# Patient Record
Sex: FEMALE | Race: BLACK OR AFRICAN AMERICAN | Employment: FULL TIME | URBAN - METROPOLITAN AREA
[De-identification: names, ages, dates, MRNs, and addresses within clinical notes are randomized per-mention and may not be internally consistent; named-entity substitution may affect disease eponyms.]

---

## 2021-07-27 ENCOUNTER — HOSPITAL ENCOUNTER (EMERGENCY)
Age: 36
Discharge: HOME | End: 2021-07-27
Attending: EMERGENCY MEDICINE

## 2021-07-27 VITALS
OXYGEN SATURATION: 100 % | HEART RATE: 95 BPM | HEIGHT: 57 IN | SYSTOLIC BLOOD PRESSURE: 120 MMHG | RESPIRATION RATE: 16 BRPM | DIASTOLIC BLOOD PRESSURE: 63 MMHG | WEIGHT: 130 LBS | TEMPERATURE: 99.8 F | BODY MASS INDEX: 28.05 KG/M2

## 2021-07-27 DIAGNOSIS — J03.90 ACUTE TONSILLITIS, UNSPECIFIED ETIOLOGY: Primary | ICD-10-CM

## 2021-07-27 LAB
COVID-19 RAPID TEST, COVR: NOT DETECTED
S PYO AG THROAT QL: NEGATIVE
SOURCE, COVRS: NORMAL

## 2021-07-27 PROCEDURE — 87880 STREP A ASSAY W/OPTIC: CPT

## 2021-07-27 PROCEDURE — 87635 SARS-COV-2 COVID-19 AMP PRB: CPT

## 2021-07-27 PROCEDURE — 74011250637 HC RX REV CODE- 250/637: Performed by: EMERGENCY MEDICINE

## 2021-07-27 PROCEDURE — 99283 EMERGENCY DEPT VISIT LOW MDM: CPT

## 2021-07-27 PROCEDURE — 87070 CULTURE OTHR SPECIMN AEROBIC: CPT

## 2021-07-27 RX ORDER — DEXAMETHASONE SODIUM PHOSPHATE 4 MG/ML
10 INJECTION, SOLUTION INTRA-ARTICULAR; INTRALESIONAL; INTRAMUSCULAR; INTRAVENOUS; SOFT TISSUE
Status: COMPLETED | OUTPATIENT
Start: 2021-07-27 | End: 2021-07-27

## 2021-07-27 RX ORDER — AMOXICILLIN AND CLAVULANATE POTASSIUM 875; 125 MG/1; MG/1
1 TABLET, FILM COATED ORAL 2 TIMES DAILY
Qty: 20 TABLET | Refills: 0 | Status: SHIPPED | OUTPATIENT
Start: 2021-07-27 | End: 2021-08-06

## 2021-07-27 RX ADMIN — DEXAMETHASONE SODIUM PHOSPHATE 10 MG: 4 INJECTION, SOLUTION INTRAMUSCULAR; INTRAVENOUS at 21:13

## 2021-07-27 NOTE — LETTER
[facility] [place]  [facility] EMERGENCY DEPT  [address] [facility] [address] [phone]    Work/School Note    Date: 7/27/2021    To Whom It May concern:    Kian Trevizo was seen and treated today in the emergency room by the following provider(s):  Attending Provider: Margo Cabello DO  Physician Assistant: JOANN Arizmendi.       Kian Trevizo may return to work 7/30/21    Sincerely,          Inga Raphael PA-C

## 2021-07-28 NOTE — ED PROVIDER NOTES
EMERGENCY DEPARTMENT HISTORY AND PHYSICAL EXAM    Date: 7/27/2021  Patient Name: Porsha Paz    History of Presenting Illness     Chief Complaint   Patient presents with    Sore Throat         History Provided By: Patient    Additional History (Context): Porsha Paz is a 37 y.o. female with asthma who presents with complaint of sore throat pain primarily on the left today. Denies fever drooling trismus or possibility of pregnancy. Not wheezing not coughing. Not vaccinated for Covid. PCP: Lizabeth Rodriguez MD        Past History     Past Medical History:  Past Medical History:   Diagnosis Date    Asthma        Past Surgical History:  History reviewed. No pertinent surgical history. Family History:  History reviewed. No pertinent family history. Social History:  Social History     Tobacco Use    Smoking status: Never Smoker    Smokeless tobacco: Never Used   Substance Use Topics    Alcohol use: Yes     Comment: rare    Drug use: No       Allergies:  No Known Allergies      Review of Systems   Review of Systems   Constitutional: Negative for fever. HENT: Positive for sore throat and trouble swallowing. Negative for congestion. Respiratory: Negative for cough. Skin: Negative for rash. All Other Systems Negative  Physical Exam     Vitals:    07/27/21 2009   BP: 120/63   Pulse: 95   Resp: 16   Temp: 99.8 °F (37.7 °C)   SpO2: 100%   Weight: 59 kg (130 lb)   Height: 4' 9\" (1.448 m)     Physical Exam  Vitals and nursing note reviewed. Constitutional:       Appearance: She is well-developed and normal weight. She is ill-appearing. HENT:      Head: Normocephalic and atraumatic. Mouth/Throat:      Pharynx: Oropharyngeal exudate and posterior oropharyngeal erythema present. Tonsils: Tonsillar exudate present. 2+ on the right. 2+ on the left. Eyes:      Pupils: Pupils are equal, round, and reactive to light. Neck:      Thyroid: No thyromegaly. Vascular: No JVD.       Trachea: No tracheal deviation. Comments: Bilateral anterior cervical lymphadenopathy tenderness  Cardiovascular:      Rate and Rhythm: Normal rate and regular rhythm. Heart sounds: Normal heart sounds. No murmur heard. No friction rub. No gallop. Pulmonary:      Effort: Pulmonary effort is normal. No respiratory distress. Breath sounds: Normal breath sounds. No stridor. No wheezing or rales. Chest:      Chest wall: No tenderness. Abdominal:      General: There is no distension. Palpations: Abdomen is soft. There is no mass. Tenderness: There is no abdominal tenderness. There is no guarding or rebound. Musculoskeletal:         General: No tenderness. Lymphadenopathy:      Cervical: Cervical adenopathy present. Skin:     General: Skin is warm and dry. Coloration: Skin is not pale. Findings: No erythema or rash. Neurological:      Mental Status: She is alert and oriented to person, place, and time. Psychiatric:         Behavior: Behavior normal.         Thought Content: Thought content normal.                Diagnostic Study Results     Labs -     Recent Results (from the past 12 hour(s))   POC GROUP A STREP    Collection Time: 07/27/21  8:31 PM   Result Value Ref Range    Group A strep (POC) Negative NEG         Radiologic Studies -   No orders to display     CT Results  (Last 48 hours)    None        CXR Results  (Last 48 hours)    None            Medical Decision Making   I am the first provider for this patient. I reviewed the vital signs, available nursing notes, past medical history, past surgical history, family history and social history. Vital Signs-Reviewed the patient's vital signs. Records Reviewed: Nursing Notes    Procedures:  Procedures    Provider Notes (Medical Decision Making): Is negative. Treat tonsillitis wait for Covid swab give Decadron discharge.     MED RECONCILIATION:  Current Facility-Administered Medications   Medication Dose Route Frequency    dexamethasone (DECADRON) 4 mg/mL Oral 10 mg  10 mg Oral NOW     Current Outpatient Medications   Medication Sig    amoxicillin-clavulanate (Augmentin) 875-125 mg per tablet Take 1 Tablet by mouth two (2) times a day for 10 days. Disposition:  home    DISCHARGE NOTE:   8:59 PM    Pt has been reexamined. Patient has no new complaints, changes, or physical findings. Care plan outlined and precautions discussed. Results of labs were reviewed with the patient. All medications were reviewed with the patient; will d/c home with augmentin. All of pt's questions and concerns were addressed. Patient was instructed and agrees to follow up with PCP, as well as to return to the ED upon further deterioration. Patient is ready to go home. Follow-up Information     Follow up With Specialties Details Why Contact Info    Roxy Eaton MD Family Medicine Schedule an appointment as soon as possible for a visit in 2 days  . [address]      [facility] EMERGENCY DEPT Emergency Medicine  If symptoms worsen return immediately [address] [place] [id number]  [phone]          Current Discharge Medication List      START taking these medications    Details   amoxicillin-clavulanate (Augmentin) 875-125 mg per tablet Take 1 Tablet by mouth two (2) times a day for 10 days. Qty: 20 Tablet, Refills: 0  Start date: 7/27/2021, End date: 8/6/2021                 Diagnosis     Clinical Impression:   1.  Acute tonsillitis, unspecified etiology

## 2021-07-30 LAB
BACTERIA SPEC CULT: NORMAL
SERVICE CMNT-IMP: NORMAL

## 2025-02-13 ENCOUNTER — HOSPITAL ENCOUNTER (OUTPATIENT)
Facility: HOSPITAL | Age: 40
Discharge: HOME | End: 2025-02-16

## 2025-02-13 LAB — SENTARA SPECIMEN COLLECTION: NORMAL

## 2025-02-13 PROCEDURE — 99001 SPECIMEN HANDLING PT-LAB: CPT
